# Patient Record
Sex: FEMALE | Race: WHITE
[De-identification: names, ages, dates, MRNs, and addresses within clinical notes are randomized per-mention and may not be internally consistent; named-entity substitution may affect disease eponyms.]

---

## 2018-06-25 ENCOUNTER — HOSPITAL ENCOUNTER (OUTPATIENT)
Dept: HOSPITAL 47 - RADUSWWP | Age: 58
Discharge: HOME | End: 2018-06-25
Payer: MEDICARE

## 2018-06-25 DIAGNOSIS — R10.9: ICD-10-CM

## 2018-06-25 DIAGNOSIS — D25.9: Primary | ICD-10-CM

## 2018-06-25 PROCEDURE — 76856 US EXAM PELVIC COMPLETE: CPT

## 2018-06-25 PROCEDURE — 76700 US EXAM ABDOM COMPLETE: CPT

## 2018-06-25 NOTE — US
EXAMINATION TYPE: US pelvic complete

 

DATE OF EXAM: 6/25/2018

 

COMPARISON: NONE

 

CLINICAL HISTORY: R10.9 Abdominal Pain. Intermittent LUQ pain

 

TECHNIQUE:  Transabdominal (TA).  

 

Date of LMP:  unknown

 

EXAM MEASUREMENTS:

 

Uterus:  11.2 x 5.8 x 9.4 cm

Endometrial Stripe: 0.6 cm

Right Ovary:  2.9 x 1.3 x 1.4 cm

Left Ovary:  2.9 x 1.2 x 1.4 cm

 

 

 

1. Uterus:  Anteverted   heterogenous in appearance, multiple calcified areas noted with largest = 3.
7 x 2.4 x 3.6cm. Solid area left uterus = 5.2 x 3.0 x 3.9cm, possible pedunculated fibroid   

2. Endometrium:  appears wnl

3. Right Ovary:  appears wnl

4. Left Ovary:  appears wnl

5. Bilateral Adnexa:  wnl

6. Posterior cul-de-sac:  wnl

 

 

 

IMPRESSION: 

1. Uterine fibroids some of which are likely calcified.

## 2018-06-25 NOTE — US
EXAMINATION TYPE: US abdomen complete

 

DATE OF EXAM: 6/25/2018

 

COMPARISON: NONE

 

CLINICAL HISTORY: R10.9 Abdominal Pain. Intermittent LUQ pain for 1 month. Cholecystectomy

 

EXAM MEASUREMENTS:

 

Liver Length:  15.5 cm   

Gallbladder Wall:  Surgically absent    

CBD:  0.4 cm

Spleen:  12.4 cm   

Right Kidney:  10.0 x 3.8 x 4.1 cm 

Left Kidney:  10.7 x 5.2 x 4.6 cm   

 

*Technical limitations due to large amount of overlying bowel content 

 

Pancreas:  Obscured by bowel gas

Liver:  hyperechoic area left lobe = 1.6 x 1.4 x 1.5cm. Hemangioma could be considered within the dif
ferential.

Gallbladder:  Surgically absent

**Evidence for sonographic Graff's sign:  No

CBD:  wnl 

Spleen:  wnl   

Right Kidney:  no evidence of hydronephrosis or mass as visualized 

Left Kidney:   no evidence of hydronephrosis or mass as visualized 

Upper IVC:  wnl  

Abd Aorta:  visualized portions appear wnl

 

IMPRESSION: 

1. Suspected hemangioma within the right lobe liver.

## 2019-12-02 ENCOUNTER — HOSPITAL ENCOUNTER (OUTPATIENT)
Dept: HOSPITAL 47 - RADMAMWWP | Age: 59
Discharge: HOME | End: 2019-12-02
Attending: FAMILY MEDICINE
Payer: COMMERCIAL

## 2019-12-02 DIAGNOSIS — Z12.31: Primary | ICD-10-CM

## 2019-12-02 PROCEDURE — 77067 SCR MAMMO BI INCL CAD: CPT

## 2019-12-02 NOTE — MM
Reason for exam: screening  (asymptomatic).

Last mammogram was performed 4 years and 1 month ago.



History:

Patient is postmenopausal.



Physical Findings:

A clinical breast exam by your physician is recommended on an annual basis and 

results should be correlated with mammographic findings.



MG Screening Mammo w CAD

Bilateral CC and MLO view(s) were taken.

Prior study comparison: October 23, 2015, right breast MG 3d work up w/cad RT.  

October 7, 2015, bilateral MG screening mammo w CAD.

The breast tissue is heterogeneously dense. This may lower the sensitivity of 

mammography.

Finding: There are typically benign dystrophic calcifications in the left breast. 

There is no discrete abnormality.





ASSESSMENT: Benign, BI-RAD 2



RECOMMENDATION:

Routine screening mammogram of both breasts in 1 year.